# Patient Record
Sex: FEMALE | URBAN - METROPOLITAN AREA
[De-identification: names, ages, dates, MRNs, and addresses within clinical notes are randomized per-mention and may not be internally consistent; named-entity substitution may affect disease eponyms.]

---

## 2018-08-22 ENCOUNTER — APPOINTMENT (RX ONLY)
Dept: URBAN - METROPOLITAN AREA CLINIC 348 | Facility: CLINIC | Age: 58
Setting detail: DERMATOLOGY
End: 2018-08-22

## 2018-08-22 DIAGNOSIS — D485 NEOPLASM OF UNCERTAIN BEHAVIOR OF SKIN: ICD-10-CM

## 2018-08-22 DIAGNOSIS — L57.0 ACTINIC KERATOSIS: ICD-10-CM

## 2018-08-22 DIAGNOSIS — L82.1 OTHER SEBORRHEIC KERATOSIS: ICD-10-CM

## 2018-08-22 DIAGNOSIS — L73.8 OTHER SPECIFIED FOLLICULAR DISORDERS: ICD-10-CM

## 2018-08-22 PROBLEM — D48.5 NEOPLASM OF UNCERTAIN BEHAVIOR OF SKIN: Status: ACTIVE | Noted: 2018-08-22

## 2018-08-22 PROCEDURE — ? TREATMENT REGIMEN

## 2018-08-22 PROCEDURE — ? COUNSELING

## 2018-08-22 PROCEDURE — 10040 EXTRACTION: CPT

## 2018-08-22 PROCEDURE — 99202 OFFICE O/P NEW SF 15 MIN: CPT | Mod: 25

## 2018-08-22 PROCEDURE — ? PRESCRIPTION

## 2018-08-22 PROCEDURE — 11100: CPT

## 2018-08-22 PROCEDURE — ? BIOPSY BY SHAVE METHOD

## 2018-08-22 PROCEDURE — ? ACNE SURGERY

## 2018-08-22 RX ORDER — IMIQUIMOD 50 MG/G
CREAM TOPICAL BIW
Qty: 2 | Refills: 3 | Status: ERX | COMMUNITY
Start: 2018-08-22

## 2018-08-22 RX ADMIN — IMIQUIMOD: 50 CREAM TOPICAL at 17:14

## 2018-08-22 ASSESSMENT — LOCATION SIMPLE DESCRIPTION DERM
LOCATION SIMPLE: RIGHT PRETIBIAL REGION
LOCATION SIMPLE: LEFT PRETIBIAL REGION
LOCATION SIMPLE: RIGHT CALF
LOCATION SIMPLE: RIGHT UPPER BACK

## 2018-08-22 ASSESSMENT — LOCATION DETAILED DESCRIPTION DERM
LOCATION DETAILED: LEFT PROXIMAL PRETIBIAL REGION
LOCATION DETAILED: RIGHT DISTAL CALF
LOCATION DETAILED: RIGHT PROXIMAL PRETIBIAL REGION
LOCATION DETAILED: RIGHT INFERIOR UPPER BACK

## 2018-08-22 ASSESSMENT — LOCATION ZONE DERM
LOCATION ZONE: TRUNK
LOCATION ZONE: LEG

## 2018-08-22 NOTE — PROCEDURE: TREATMENT REGIMEN
Detail Level: Zone
Plan: Patient to start Aldara in September, for 4 weeks. 2 weeks of treatment on one shin, 2 weeks of treatment on other shin. If showing symptoms, patient to apply Aldara M W F (every other day). Patient due back in mid November for follow up appointment
Initiate Treatment: Aldara- M-F once daily x 4 weeks, off weekends

## 2018-08-22 NOTE — PROCEDURE: ACNE SURGERY
Prep Text (Optional): Prior to removal the treatment areas were prepped in the usual fashion.
Post-Care Instructions: I reviewed with the patient in detail post-care instructions. Patient is to keep the treatment areas dry overnight, and then apply bacitracin twice daily until healed. Patient may apply hydrogen peroxide soaks to remove any crusting.
Consent was obtained and risks were reviewed including but not limited to scarring, infection, bleeding, scabbing, incomplete removal, and allergy to anesthesia.
Render Post-Care Instructions In Note?: no
Detail Level: Detailed
Acne Type: Comedonal Lesions
Extraction Method: 11 blade and comedo extractor

## 2024-05-28 NOTE — PROCEDURE: BIOPSY BY SHAVE METHOD
Operative Note      Patient: Ezequiel Doshi  YOB: 1940  MRN: [id number]    Date of Procedure: 5/28/2024    Pre-Op Diagnosis Codes:     * Pancreatic mass [K86.89]    Post-Op Diagnosis:  4 cm hiatal hernia, gastritis, pancreatic neck mass s/p FNB.       Procedure(s):  ESOPHAGOGASTRODUODENOSCOPY ENDOSCOPIC ULTRASOUND FINE NEEDLE ASPIRATION  ESOPHAGOGASTRODUODENOSCOPY BIOPSY    Surgeon(s):  Jeremy Noble MD    Assistant:   * No surgical staff found *    Anesthesia: Monitor Anesthesia Care    Estimated Blood Loss (mL): Minimal    Complications: None    Specimens:   ID Type Source Tests Collected by Time Destination   A : Duodenum bx Tissue Duodenum SURGICAL PATHOLOGY Jeremy Noble MD 5/28/2024 1330    B : Gastric bx Tissue Gastric SURGICAL PATHOLOGY Jeremy Noble MD 5/28/2024 1331    C : Duodenal nodule bx Tissue Duodenum SURGICAL PATHOLOGY Jeremy Noble MD 5/28/2024 1332    D : Neck of the pancreas mass FNB Tissue Pancreas CYTOLOGY, NON-GYN Jeremy Noble MD 5/28/2024 1351    E : Neck of the pancreas mass  FNB Tissue Pancreas CYTOLOGY, NON-GYN (Canceled) Jeremy Noble MD 5/28/2024 1356        Implants:  Implant Name Type Inv. Item Serial No.  Lot No. LRB No. Used Action   CLIP ENDOSCP 235CM RESOL 360 ORDER U[employer] IS EACH - [id number]  CLIP ENDOSCP 235CM RESOL 360 ORDER UOM IS EACH  [employer]- [id number]  2 Implanted         Drains: * No LDAs found *    Findings:  Infection Present At Time Of Surgery (PATOS) (choose all levels that have infection present):  No infection present        Description of Procedure:  Informed consent was obtained from the patient after explanation of the procedure including indications, description of the procedure,  benefits and possible risks and complications of the procedure, and alternatives. Questions were answered.  The patient's history was reviewed and a directed physical examination was performed prior 
Size Of Lesion In Cm: 0
Render Post-Care Instructions In Note?: yes
Destruction After The Procedure: No
Post-Care Instructions: I reviewed with the patient in detail post-care instructions. Patient is to keep the biopsy site dry overnight, and then apply aquaphor twice daily until healed.
Billing Type: Third-Party Bill
Silver Nitrate Text: The wound bed was treated with silver nitrate after the biopsy was performed.
Dressing: bandage
Electrodesiccation And Curettage Text: The wound bed was treated with electrodesiccation and curettage after the biopsy was performed.
Depth Of Biopsy: dermis
Anesthesia Volume In Cc (Will Not Render If 0): 0.5
Curettage Text: The wound bed was treated with curettage after the biopsy was performed.
Notification Instructions: Patient understands they will return to the office in 2-3 weeks for biopsy results.
Biopsy Method: Dermablade
Biopsy Type: H and E
Consent: Verbal consent was obtained and risks were reviewed including but not limited to scarring, infection, bleeding, scabbing, incomplete removal, nerve damage and allergy to anesthesia.
Type Of Destruction Used: Curettage
Wound Care: Aquaphor
Electrodesiccation Text: The wound bed was treated with electrodesiccation after the biopsy was performed.
Cryotherapy Text: The wound bed was treated with cryotherapy after the biopsy was performed.
Detail Level: Detailed
Anesthesia Type: 1% lidocaine with epinephrine
Hemostasis: Drysol
no
